# Patient Record
Sex: FEMALE | Race: WHITE
[De-identification: names, ages, dates, MRNs, and addresses within clinical notes are randomized per-mention and may not be internally consistent; named-entity substitution may affect disease eponyms.]

---

## 2018-02-23 ENCOUNTER — HOSPITAL ENCOUNTER (OUTPATIENT)
Dept: HOSPITAL 33 - SDC | Age: 58
Discharge: HOME | End: 2018-02-23
Attending: FAMILY MEDICINE
Payer: COMMERCIAL

## 2018-02-23 VITALS — OXYGEN SATURATION: 99 % | SYSTOLIC BLOOD PRESSURE: 133 MMHG | DIASTOLIC BLOOD PRESSURE: 61 MMHG | HEART RATE: 51 BPM

## 2018-02-23 DIAGNOSIS — Z12.11: Primary | ICD-10-CM

## 2018-02-23 PROCEDURE — 00812 ANES LWR INTST SCR COLSC: CPT

## 2018-02-23 PROCEDURE — 0DJD8ZZ INSPECTION OF LOWER INTESTINAL TRACT, VIA NATURAL OR ARTIFICIAL OPENING ENDOSCOPIC: ICD-10-PCS | Performed by: FAMILY MEDICINE

## 2018-02-23 NOTE — OP
SURGERY DATE/TIME:    02/23/2018  0743



PREOPERATIVE DIAGNOSIS:      Screening exam.



POSTOPERATIVE DIAGNOSIS:      Normal colon. 



PROCEDURE:    Colonoscopy.



SURGEON:    Dr. Sandoval.



ANESTHESIA:    MAC. Medications given by anesthesia department. 



HISTORY: The patient is a 57 year-old white female presenting now for her first 
colonoscopy. The patient was appraised of the risks of the procedure including the risk of 
perforation, phlebitis, untoward reaction to medication, bleeding and missed lesions.  The 
patient verbalized her understanding and desired to have the procedure performed.



DESCRIPTION OF PROCEDURE:  The patient was given the medications by the anesthesia 
department. She had continuous pulse oximetry, ECG monitoring, intermittent blood pressure 
monitoring and tidal CO2 monitoring during the examination.  She was placed in the left 
lateral decubitus position.  A digital rectal examination was performed and revealed 
normal anal sphincter tone and no masses.  The flexible Olympus pediatric colonoscope was 
used to intubate the rectum.  A view of the colon was developed sequentially to the cecum. 
Upon insertion and withdrawal, including a retroflex view in the rectum, no mucosal 
lesions were encountered.  The scope was removed from the patient who tolerated the 
procedure well and was sent back to OP recovery in good condition. The prep was noted to 
be fair.